# Patient Record
Sex: FEMALE | Race: WHITE
[De-identification: names, ages, dates, MRNs, and addresses within clinical notes are randomized per-mention and may not be internally consistent; named-entity substitution may affect disease eponyms.]

---

## 2019-06-07 ENCOUNTER — HOSPITAL ENCOUNTER (INPATIENT)
Dept: HOSPITAL 5 - TRG | Age: 34
LOS: 2 days | Discharge: HOME | End: 2019-06-09
Attending: OBSTETRICS & GYNECOLOGY | Admitting: OBSTETRICS & GYNECOLOGY
Payer: COMMERCIAL

## 2019-06-07 DIAGNOSIS — D50.0: ICD-10-CM

## 2019-06-07 DIAGNOSIS — Z83.3: ICD-10-CM

## 2019-06-07 DIAGNOSIS — Z3A.39: ICD-10-CM

## 2019-06-07 LAB
HCT VFR BLD CALC: 36.7 % (ref 30.3–42.9)
HGB BLD-MCNC: 11.4 GM/DL (ref 10.1–14.3)
MCHC RBC AUTO-ENTMCNC: 31 % (ref 30–34)
MCV RBC AUTO: 87 FL (ref 79–97)
PLATELET # BLD: 219 K/MM3 (ref 140–440)
RBC # BLD AUTO: 4.22 M/MM3 (ref 3.65–5.03)

## 2019-06-07 PROCEDURE — 59025 FETAL NON-STRESS TEST: CPT

## 2019-06-07 PROCEDURE — A6250 SKIN SEAL PROTECT MOISTURIZR: HCPCS

## 2019-06-07 PROCEDURE — 86901 BLOOD TYPING SEROLOGIC RH(D): CPT

## 2019-06-07 PROCEDURE — 36415 COLL VENOUS BLD VENIPUNCTURE: CPT

## 2019-06-07 PROCEDURE — 85018 HEMOGLOBIN: CPT

## 2019-06-07 PROCEDURE — 86900 BLOOD TYPING SEROLOGIC ABO: CPT

## 2019-06-07 PROCEDURE — 76815 OB US LIMITED FETUS(S): CPT

## 2019-06-07 PROCEDURE — 85014 HEMATOCRIT: CPT

## 2019-06-07 PROCEDURE — 86850 RBC ANTIBODY SCREEN: CPT

## 2019-06-07 PROCEDURE — 86592 SYPHILIS TEST NON-TREP QUAL: CPT

## 2019-06-07 PROCEDURE — 85027 COMPLETE CBC AUTOMATED: CPT

## 2019-06-07 NOTE — HISTORY AND PHYSICAL REPORT
History of Present Illness


Date of examination: 19


Date of admission: 


19 19:20





Chief complaint: 


Contractions





History of present illness: 


33 year old  presents to L&D in labor. Patient states her contractions 

began late this afternoon. Patient reports active fetal movement. Patient denies

vaginal bleeding. 


Patient received prenatal care at [facility]. She brings 

prenatal records with her. 


LMP unknown, possibly 18. EDC 19. 


Pregnancy significant for the following: Elevated 1 hour sugar test (normal 3 

hour OGTT), anemia (supplemented with iron). 


Prenatal labs are as follows: A+, antibody screen negative, pap smear normal, 

rubella immune, RPR nonreactive, Hepatitis B surface antigen negative, HIV 

negative, chlamydia negative, gonorrhea negative, quad screen negative, 1 hour 

sugar test 144 (normal 3 hour OGTT), GBS negative. 











Past History


Past Medical History: no pertinent history


Past Surgical History: no surgical history


GYN History: denies: abnormal PAP smear, chlamydia, gonorrhea, hepatitis B, 

herpes, HIV, syphilis, trichomonas


Family/Genetic History: diabetes


Social history: , lives with family, full code.  denies: smoking, alcohol

abuse, prescription drug abuse, IV drug use





- Obstetrical History


Expected Date of Delivery: 19


Actual Gestation: 39 Week(s) 1 Day(s) 


: 2


Para: 1


Hx # Term Pregnancies: 1


Number of  Pregnancies: 0


Spontaneous Abortions: 0


Induced : 0


Number of Living Children: 1





Medications and Allergies


                                    Allergies











Allergy/AdvReac Type Severity Reaction Status Date / Time


 


No Known Allergies Allergy   Verified 19 16:48











Active Meds: 


Active Medications





Ephedrine Sulfate (Ephedrine Sulfate)  10 mg IV Q2M PRN


   PRN Reason: Hypotension


Fentanyl (Sublimaze)  100 mcg IV Q2H PRN


   PRN Reason: Labor Pain


Oxytocin/Sodium Chloride (Pitocin/Ns 20 Unit/1000ml Drip)  20 units in 1,000 mls

@ 125 mls/hr IV AS DIRECT LEANN


Lactated Ringer's (Lactated Ringers)  1,000 mls @ 125 mls/hr IV AS DIRECT LEANN


Lidocaine (Xylocaine 2%)  20 ml INFILTRATI ONCE ONE


   Stop: 19 20:01


Terbutaline Sulfate (Brethine)  0.25 mg SUB-Q ONCE PRN


   PRN Reason: Hyperstimulation/Hypertonicity











Review of Systems


All systems: negative (contractions)





- Vital Signs


Vital signs: 


                                   Vital Signs











Pulse Pulse Ox


 


 72   95 


 


 19 16:46  19 16:46








                                        











Temp Pulse Resp BP Pulse Ox


 


 98 F   55 L  20   125/80   95 


 


 19 16:48  19 18:54  19 16:48  19 18:54  19 18:53














- Physical Exam


Abdomen: Positive: normal appearance, soft.  Negative: distention, tenderness, 

guarding, rigidity


Genitourinary (Female): Positive: normal external genitalia, normal perenium.  

Negative: perineal/vulvar lesions


Vagina: Positive: normal moisture, other (SSE performed: no pooling, negative 

nitrazine, negative fern)


Uterus: Positive: enlarged (size=dates).  Negative: tender


Anus/Rectum: Positive: normal perianal skin


Extremities: Positive: normal, edema (mild pedal edema bilaterally).  Negative: 

tenderness





- Obstetrical


FHR: category 1


Uterine Contraction Monitor Mode: External


Cervical Dilatation: 4.5


Cervical Effacement Percentage: 90


Fetal station: -1


Uterine Contraction Pattern: Regular


Uterine Contraction Intensity: Mild





Results


All other labs normal.








Assessment and Plan


A: Pregnancy at 39 weeks, 1 day gestation. 


Labor. 


GBS negative. 


P: Admit.


Continuous EFM.


Anticipate vaginal birth.

## 2019-06-07 NOTE — ULTRASOUND REPORT
PROCEDURE: US OB LIMITED 

 

TECHNIQUE:  Limited OB ultrasound for ANA LAURA 

 

HISTORY: ANA LAURA 

 

COMPARISONS: None  

 

FINDINGS: 

 

LMP 11/15/2018                  clinical Age :  29W  1 D 

LMP EDC 8/22/2019 

 

Fetal Presentation: Cephalic 

Fetal Activity: Monitored 

Cardiac motion: 139 BPM using M-mode doppler 

 

Amniotic Fluid Volume: Adequate 

ANA LAURA: 7.6 cm (normal) 

 

IMPRESSION: 

 

Single viable pregnancy at 29 weeks 1 days with ANA LAURA is 7.6 cm (normal). 

 

This document is electronically signed by Salena Rankin MD., June 7 2019 07:14:02 PM ET

## 2019-06-08 LAB
HCT VFR BLD CALC: 29.6 % (ref 30.3–42.9)
HGB BLD-MCNC: 9.9 GM/DL (ref 10.1–14.3)

## 2019-06-08 RX ADMIN — IBUPROFEN SCH MG: 600 TABLET, FILM COATED ORAL at 09:45

## 2019-06-08 RX ADMIN — IBUPROFEN SCH MG: 600 TABLET, FILM COATED ORAL at 21:31

## 2019-06-08 RX ADMIN — IBUPROFEN SCH MG: 600 TABLET, FILM COATED ORAL at 03:45

## 2019-06-08 RX ADMIN — DOCUSATE SODIUM SCH MG: 100 CAPSULE, LIQUID FILLED ORAL at 21:31

## 2019-06-08 RX ADMIN — DOCUSATE SODIUM SCH MG: 100 CAPSULE, LIQUID FILLED ORAL at 09:46

## 2019-06-08 RX ADMIN — IBUPROFEN SCH: 600 TABLET, FILM COATED ORAL at 15:00

## 2019-06-08 RX ADMIN — FERROUS SULFATE TAB 325 MG (65 MG ELEMENTAL FE) SCH MG: 325 (65 FE) TAB at 21:31

## 2019-06-08 NOTE — PROCEDURE NOTE
OB Delivery Note





- Delivery


Date of Delivery: 19


Surgeon: ISRAEL ARRIOLA


Estimated blood loss: 200cc





- Vaginal


Delivery presentation: vertex


Delivery position: OA


Intrapartum events: none


Delivery induction: none


Delivery monitor: external FHT, external uterine


Route of delivery: 


Delivery placenta: spontaneous


Delivery cord: 3 umbilical vessels, other (short cord)


Delivery laceration: 1st degree


Anesthesia: none


Delivery comments: 


Spontaneous vaginal delivery at 01:47 of liveborn male infant weighing 8 lb. 6 

oz. over 1st degree perineal laceration with apgars of 8/9. Baby placed 

immediately on mother's abdomen after birth. Short cord noted. Spontaneous cry 

and respirations. Baby dried and bulb suctioned. 3 vessel cord double clamped 

and cut after cessation of pulsation. Spontaneous delivery of intact placenta 

and membranes.  cc. Pitocin to IV fluids after delivery of placenta. 

Fundus firm and midline. 1st degree perineal laceration repaired with vicryl in 

usual sterile fashion. Vaginal sweep negative. Sponge count correct. Mother and 

baby stable in birthing room.

## 2019-06-09 VITALS — DIASTOLIC BLOOD PRESSURE: 75 MMHG | SYSTOLIC BLOOD PRESSURE: 118 MMHG

## 2019-06-09 RX ADMIN — IBUPROFEN SCH MG: 600 TABLET, FILM COATED ORAL at 10:04

## 2019-06-09 RX ADMIN — FERROUS SULFATE TAB 325 MG (65 MG ELEMENTAL FE) SCH MG: 325 (65 FE) TAB at 10:05

## 2019-06-09 RX ADMIN — DOCUSATE SODIUM SCH MG: 100 CAPSULE, LIQUID FILLED ORAL at 10:05

## 2019-06-09 RX ADMIN — IBUPROFEN SCH: 600 TABLET, FILM COATED ORAL at 03:00

## 2019-06-09 NOTE — DISCHARGE SUMMARY
Providers





- Providers


Date of Admission: 


19 19:20





Date of discharge: 19


Attending physician: 


LO JUAREZ MD





None


Primary care physician: 


LO JUAREZ MD








Hospitalization


Reason for admission: active labor


Delivery: 


Episiotomy: none


Laceration: 1st degree


Other postpartum procedures: none


Postpartum complications: none


Discharge diagnosis: IUP at term delivered


 baby: male


Pertinent studies: 


Labs





Hospital course: 


Normal hospital course.





Condition at discharge: Good


Disposition: DC-01 TO HOME OR SELFCARE





- Discharge Diagnoses


(1) Term pregnancy delivered


Status: Acute   





(2) Anemia due to blood loss


Status: Acute   





Plan





- Provider Discharge Summary


Activity: routine, no sex for 6 weeks, no heavy lifting 4 weeks, no strenuous 

exercise


Diet: routine


Instructions: routine


Additional instructions: 


Continue taking your prenatal vitamins and iron supplements at home. Take your 

vitamin every day. Take your iron supplement twice per day. 


Call your doctor immediately for:


* Fever > 100.5


* Heavy vaginal bleeding ( >1 pad per hour)


* Severe persistent headache


* Shortness of breath


* Reddened, hot, painful area to leg or breast








- Follow up plan


Follow up: 


LO JUAREZ MD [Primary Care Provider] - 6 Weeks

## 2019-06-09 NOTE — PROGRESS NOTE
Assessment and Plan


A: Postpartum day 1 S/P spontaneous vaginal delivery.


Anemia secondary to pregnancy and blood loss.


P: Discharge patient home today when baby is able to go. Postpartum discharge 

instructions and warning signs discussed with patient. Advised patient to avoid 

intercourse, lifting and heavy housework, driving and tub baths. Advised patient

to continue taking her prenatal vitamins and iron supplements at home. Advised 

patient to follow up at [facility] in 6 weeks for postpartum exam 

(pt. to call the clinic for an appointment). Pt. voiced understanding of all 

instructions. 











Subjective





- Subjective


Date of service: 19


Principal diagnosis: Postpartum day 1 S/P spontaneous vaginal delivery


Interval history: 


Postpartum day 1 S/P spontaneous vaginal delivery. Doing well. Patient desires 

discharge today. 


Patient reports small amount of lochia. She is voiding without difficulty, 

ambulating well, and tolerating a regular diet. 


Patient denies headache, chest pain, shortness of breath, cough, leg pain, 

abdominal pain, or heavy vaginal bleeding. 





Patient reports: appetite normal, voiding normally, pain well controlled, 

flatus, ambulating normally, no dizzy ambulation, no nauseated


: doing well





Objective





- Vital Signs


Latest vital signs: 


                                   Vital Signs











  Temp Pulse Resp BP BP Pulse Ox


 


 19 08:26  98.0 F  71  18  105/52   97


 


 19 00:53  98.1 F  70  18   102/61  97


 


 19 16:42  98.1 F  76  20  108/68   96


 


 19 12:43  98.0 F  66  20  105/50   98








                                Intake and Output











 19





 23:59 07:59 15:59


 


Intake Total 360 240 360


 


Output Total 600  


 


Balance -240 240 360


 


Intake:   


 


  Intake, Free Water 360 240 360


 


Output:   


 


  Urine 600  


 


    Void 600  


 


Other:   


 


  Total, Output Amount 600  


 


  # Voids   


 


    Void  2 1














- Exam


Abdomen: Present: normal appearance, soft.  Absent: distention, tenderness, 

guarding, rigidity


Uterus: Present: normal, firm, fundal height below umbilicus.  Absent: 

bogginess, tenderness


Extremities: Present: normal.  Absent: tenderness, edema





- Labs


Labs: 


                              Abnormal lab results











  19 Range/Units





  16:20 


 


Hgb  9.9 L  (10.1-14.3)  gm/dl


 


Hct  29.6 L D  (30.3-42.9)  %